# Patient Record
Sex: MALE | Race: WHITE | NOT HISPANIC OR LATINO | Employment: OTHER | URBAN - NONMETROPOLITAN AREA
[De-identification: names, ages, dates, MRNs, and addresses within clinical notes are randomized per-mention and may not be internally consistent; named-entity substitution may affect disease eponyms.]

---

## 2021-11-12 ENCOUNTER — HOSPITAL ENCOUNTER (EMERGENCY)
Facility: HOSPITAL | Age: 66
Discharge: HOME | End: 2021-11-12
Attending: NURSE PRACTITIONER | Admitting: NURSE PRACTITIONER
Payer: MEDICARE

## 2021-11-12 ENCOUNTER — APPOINTMENT (OUTPATIENT)
Dept: GENERAL RADIOLOGY | Facility: HOSPITAL | Age: 66
End: 2021-11-12
Attending: NURSE PRACTITIONER
Payer: MEDICARE

## 2021-11-12 VITALS
OXYGEN SATURATION: 98 % | SYSTOLIC BLOOD PRESSURE: 129 MMHG | DIASTOLIC BLOOD PRESSURE: 82 MMHG | TEMPERATURE: 98.9 F | RESPIRATION RATE: 16 BRPM | HEART RATE: 94 BPM

## 2021-11-12 DIAGNOSIS — M25.561 RIGHT MEDIAL KNEE PAIN: Primary | ICD-10-CM

## 2021-11-12 PROCEDURE — G0463 HOSPITAL OUTPT CLINIC VISIT: HCPCS

## 2021-11-12 PROCEDURE — 73562 X-RAY EXAM OF KNEE 3: CPT | Mod: RT

## 2021-11-12 PROCEDURE — 99213 OFFICE O/P EST LOW 20 MIN: CPT | Performed by: NURSE PRACTITIONER

## 2021-11-12 RX ORDER — LEVOTHYROXINE SODIUM 100 UG/1
100 TABLET ORAL
COMMUNITY
Start: 2021-05-05

## 2021-11-12 RX ORDER — LISINOPRIL 5 MG/1
TABLET ORAL
COMMUNITY
Start: 2021-01-29

## 2021-11-12 RX ORDER — LEVOCETIRIZINE DIHYDROCHLORIDE 5 MG/1
TABLET, FILM COATED ORAL
COMMUNITY
Start: 2021-01-26

## 2021-11-12 ASSESSMENT — ENCOUNTER SYMPTOMS
ARTHRALGIAS: 1
MYALGIAS: 1
JOINT SWELLING: 1

## 2021-11-12 NOTE — ED PROVIDER NOTES
History     Chief Complaint   Patient presents with     Knee Pain     HPI  Nick Russo is a 66 year old male who presents evaluation of right knee pain.  Patient notes that he slipped on/yesterday and fell injuring his right knee somehow.  Patient reports medial right knee pain which appears to worsen with extension and flexion of his knee.  He is able to ambulate but very slowly.  Denies any history of knee surgeries.  Patient has no other concerns.    Allergies:  Allergies   Allergen Reactions     Advil [Ibuprofen]      Bees      Penicillins        Problem List:    There are no problems to display for this patient.       Past Medical History:    History reviewed. No pertinent past medical history.    Past Surgical History:    History reviewed. No pertinent surgical history.    Family History:    History reviewed. No pertinent family history.    Social History:  Marital Status:   [2]  Social History     Tobacco Use     Smoking status: Never Smoker     Smokeless tobacco: Never Used   Substance Use Topics     Alcohol use: Yes     Drug use: Never        Medications:    levocetirizine (XYZAL) 5 MG tablet  levothyroxine (SYNTHROID/LEVOTHROID) 100 MCG tablet  lisinopril (ZESTRIL) 5 MG tablet          Review of Systems   Musculoskeletal: Positive for arthralgias, gait problem, joint swelling and myalgias.   All other systems reviewed and are negative.      Physical Exam   BP: 129/82  Pulse: 94  Temp: 98.9  F (37.2  C)  Resp: 16  SpO2: 98 %      Physical Exam  Vitals and nursing note reviewed.   Constitutional:       Appearance: Normal appearance. He is not ill-appearing or toxic-appearing.   HENT:      Head: Normocephalic.   Eyes:      Pupils: Pupils are equal, round, and reactive to light.   Cardiovascular:      Rate and Rhythm: Normal rate.   Pulmonary:      Effort: Pulmonary effort is normal.   Musculoskeletal:         General: Tenderness present.      Cervical back: Neck supple.      Right knee: Swelling  present. No deformity, erythema or ecchymosis. Tenderness present over the medial joint line. No lateral joint line, LCL, ACL or PCL tenderness.      Instability Tests: Anterior Lachman test negative. Medial Kevin test negative.      Right lower leg: No edema.      Left lower leg: No edema.   Skin:     General: Skin is warm and dry.      Capillary Refill: Capillary refill takes less than 2 seconds.      Findings: No bruising or erythema.   Neurological:      Mental Status: He is alert.         ED Course        Procedures         Results for orders placed or performed during the hospital encounter of 11/12/21 (from the past 24 hour(s))   XR Knee Right 3 Views    Narrative    PROCEDURE:  XR KNEE RIGHT 3 VIEWS    HISTORY: slipped and fell yesterday. c/o medial pain and some  difficulty ambulating.    COMPARISON:  None.    TECHNIQUE:  3 views of the right knee were obtained.    FINDINGS:  No fracture or dislocation is identified. The joint spaces  are preserved.        Impression    IMPRESSION: Normal right knee      TOM FLOR MD         SYSTEM ID:  [id number]       Medications - No data to display    Assessments & Plan (with Medical Decision Making)     I have reviewed the nursing notes.    66-year-old male that presented for concerns of right knee pain post fall that occurred yesterday.  Patient is able to extend and flex his knee but reports pain with both of those movements.  Tenderness to medial right knee along with mild swelling.  X-ray negative for acute fracture or dislocation.  Discussed all findings with patient noting suspect meniscus/ligament injury.  No appreciable LCL or MCL laxity.  Ace wrap applied in urgent care.  Recommended applying ice packs 15 minutes on/off and continuing to take Tylenol or ibuprofen as needed for pain.  Advised follow-up with PCP if no improvement in symptoms.  Return to ED/UC for worsening or concerning symptoms.    I have reviewed the findings, diagnosis, plan and need  for follow up with the patient.  This document was prepared using a combination of typing and voice generated software.  While every attempt was made for accuracy, spelling and grammatical errors may exist.    New Prescriptions    No medications on file       Final diagnoses:   Right medial knee pain       11/12/2021   [facility]     Mpofu, Prudence, CNP  11/12/21 8706

## 2021-11-12 NOTE — ED TRIAGE NOTES
Patient was stepping down in the slush, foot slipped out and patient went down. Unsure of what happened to his right knee

## 2021-11-12 NOTE — ED TRIAGE NOTES
"Pt presents with c/o right knee pain. Reports he slipped in the slush yesterday. States he felt something in the knee and that it has been really stiff. Also reports that if he changed positions/angles of the knee he gets a sharp pain. Resting pain is \"almost nothing\" and when he moves it pain is a 8/10. Pt states that last night he took two tylenol but otherwise that is it. Pt was able to ambulate by himself to room.  "

## 2021-11-12 NOTE — DISCHARGE INSTRUCTIONS
Use the Ace wrap, apply ice packs 15 minutes on/off.  Continue taking Tylenol or ibuprofen as needed for the pain.    Follow-up with your doctor as needed if no improvement in symptoms.    Return to emergency department for worsening or concerning symptoms.

## 2024-11-10 ENCOUNTER — HOSPITAL ENCOUNTER (EMERGENCY)
Facility: HOSPITAL | Age: 69
Discharge: HOME | End: 2024-11-10
Attending: EMERGENCY MEDICINE
Payer: MEDICARE

## 2024-11-10 VITALS
TEMPERATURE: 98.5 F | RESPIRATION RATE: 16 BRPM | OXYGEN SATURATION: 96 % | DIASTOLIC BLOOD PRESSURE: 92 MMHG | HEART RATE: 72 BPM | SYSTOLIC BLOOD PRESSURE: 153 MMHG

## 2024-11-10 DIAGNOSIS — S05.02XA ABRASION OF LEFT CORNEA, INITIAL ENCOUNTER: ICD-10-CM

## 2024-11-10 DIAGNOSIS — H53.8 BLURRED VISION, LEFT EYE: ICD-10-CM

## 2024-11-10 PROCEDURE — 99283 EMERGENCY DEPT VISIT LOW MDM: CPT

## 2024-11-10 PROCEDURE — 250N000009 HC RX 250: Performed by: EMERGENCY MEDICINE

## 2024-11-10 PROCEDURE — 99283 EMERGENCY DEPT VISIT LOW MDM: CPT | Performed by: EMERGENCY MEDICINE

## 2024-11-10 RX ORDER — TETRACAINE HYDROCHLORIDE 5 MG/ML
1-2 SOLUTION OPHTHALMIC ONCE
Status: COMPLETED | OUTPATIENT
Start: 2024-11-10 | End: 2024-11-10

## 2024-11-10 RX ORDER — ERYTHROMYCIN 5 MG/G
0.5 OINTMENT OPHTHALMIC 3 TIMES DAILY
Qty: 3.5 G | Refills: 0 | Status: SHIPPED | OUTPATIENT
Start: 2024-11-10 | End: 2024-11-17

## 2024-11-10 RX ADMIN — TETRACAINE HYDROCHLORIDE 1 DROP: 5 SOLUTION OPHTHALMIC at 13:31

## 2024-11-10 RX ADMIN — FLUORESCEIN SODIUM 1 STRIP: 1 STRIP OPHTHALMIC at 13:33

## 2024-11-10 ASSESSMENT — COLUMBIA-SUICIDE SEVERITY RATING SCALE - C-SSRS
1. IN THE PAST MONTH, HAVE YOU WISHED YOU WERE DEAD OR WISHED YOU COULD GO TO SLEEP AND NOT WAKE UP?: NO
2. HAVE YOU ACTUALLY HAD ANY THOUGHTS OF KILLING YOURSELF IN THE PAST MONTH?: NO
6. HAVE YOU EVER DONE ANYTHING, STARTED TO DO ANYTHING, OR PREPARED TO DO ANYTHING TO END YOUR LIFE?: NO

## 2024-11-10 ASSESSMENT — VISUAL ACUITY
OU: 20/70
OD: 20/70
OU: NORMAL
OS: 20/200

## 2024-11-10 NOTE — DISCHARGE INSTRUCTIONS
You were seen in the emergency department for left eye trauma and blurred vision.  Your evaluation showed a small scratch on the outer surface of the eye.  This is positioned over your cornea and we think this is why your vision is worse.  You could also be having early symptoms of another condition called traumatic iritis.  We are going to start you on a prophylactic antibiotic eye ointment.  Please take this as prescribed for the next week.  As we discussed we would recommend follow-up as soon as possible with a eye specialist.  We attached the number for the local eye clinic.  Please follow-up with them as soon as possible, ideally within the next day or two for a comprehensive eye exam and to follow-up any ongoing symptoms.

## 2024-11-10 NOTE — ED NOTES
Patient is discharged home in stable condition. Discharge instructions were given with no questions.

## 2024-11-10 NOTE — ED PROVIDER NOTES
EMERGENCY DEPARTMENT ENCOUNTER      NAME: Nick Russo  AGE: 69 year old male  YOB: 1955  MRN: [id number]  EVALUATION DATE & TIME: 11/10/2024  1:19 PM    PCP: No Ref-Primary, Physician    ED PROVIDER: Baudilio Yarbrough M.D.      Chief Complaint   Patient presents with    Eye Injury         FINAL IMPRESSION:  1. Abrasion of left cornea, initial encounter    2. Blurred vision, left eye          ED COURSE & MEDICAL DECISION MAKIN year old male presents to the Emergency Department for evaluation of left eye injury.  Patient sustained an injury almost 24 hours prior to arrival here.  On exam he has a small corneal abrasion that does involve the visual axis and could be accounting for the patient's decreased visual acuity.  His pupils are equal and reactive bilaterally.  Considered but seems less likely to represent something like a traumatic iritis.  No foreign bodies were identified on exam.  We discussed prophylactic erythromycin ointment and he was recommended to follow-up with eye clinic for continued evaluation. He was given contact information for this. Patient discharged in stable condition.    At the conclusion of the encounter I discussed the results of all of the tests and the disposition. The questions were answered. The patient or family acknowledged understanding and was agreeable with the care plan.           MEDICATIONS GIVEN IN THE EMERGENCY:  Medications   tetracaine (PONTOCAINE) 0.5 % ophthalmic solution 1-2 drop (1 drop Both Eyes $Given 11/10/24 1331)   fluorescein (FUL-SHEYLA) ophthalmic strip 1 strip (1 strip Both Eyes $Given 11/10/24 1333)       NEW PRESCRIPTIONS STARTED AT TODAY'S ER VISIT  New Prescriptions    ERYTHROMYCIN (ROMYCIN) 5 MG/GM OPHTHALMIC OINTMENT    Place 0.5 inches Into the left eye 3 times daily for 7 days.          =================================================================    HPI    Patient information was obtained from: Patient      Nick Russo is  a 69 year old male who presents to this ED today for evaluation of eye injury.  Patient sustained an injury yesterday afternoon.  He was hunting and moving through some thick brush when he got poked in the left eye with a stick.  Since that time he notes that he has some discomfort on his left eye and also his vision is blurred.  He normally has better vision in his left than his right.  He has glasses that he uses occasionally.  Does not wear contact lenses.       REVIEW OF SYSTEMS   All systems reviewed and negative except as noted in HPI.    PAST MEDICAL HISTORY:  No past medical history on file.    PAST SURGICAL HISTORY:  No past surgical history on file.        CURRENT MEDICATIONS:    No current facility-administered medications for this encounter.     Current Outpatient Medications   Medication Sig Dispense Refill    erythromycin (ROMYCIN) 5 MG/GM ophthalmic ointment Place 0.5 inches Into the left eye 3 times daily for 7 days. 3.5 g 0    levocetirizine (XYZAL) 5 MG tablet   See Instructions, Maintenance, Dispense: 90 Tablet, Refills: 3, Total Refills: 3, TAKE ONE TABLET BY MOUTH EVERY EVENING, 01/26/21 8:33:00 EST, Pharmacy: [facility], TAKE ONE TABLET BY MOUTH EVERY EVENING, 179, cm, 06/05/20 13:32:00 EDT, Heigh...      levothyroxine (SYNTHROID/LEVOTHROID) 100 MCG tablet Take 100 mcg by mouth      lisinopril (ZESTRIL) 5 MG tablet   See Instructions, Maintenance, Dispense: 90 Tablet, Refills: 3, Total Refills: 3, TAKE ONE TABLET BY MOUTH DAILY, 01/29/21 8:27:00 EST, Pharmacy: [facility], TAKE ONE TABLET BY MOUTH DAILY, 179, cm, 06/05/20 13:32:00 EDT, Height CM, 96.6, kg,...           ALLERGIES:  Allergies   Allergen Reactions    Advil [Ibuprofen]     Bees     Penicillins        FAMILY HISTORY:  No family history on file.    SOCIAL HISTORY:   Social History     Socioeconomic History    Marital status:    Tobacco Use    Smoking status: Never    Smokeless tobacco: Never   Substance and  Sexual Activity    Alcohol use: Yes    Drug use: Never       VITALS:  /92   Pulse 72   Temp 98.5  F (36.9  C) (Tympanic)   Resp 16   SpO2 96%     PHYSICAL EXAM    Constitutional: Well developed, Well nourished, NAD.   HENT: Normocephalic, Atraumatic. Neck Supple.  Eyes: There is a small corneal abrasion to the left anterior eye, just left of center and overlying a portion of the pupil.  Left eye conjunctiva is mildly injected.  Lids and lashes everted with no foreign bodies.  Extraocular movements are intact without pain.  Pupils are equal and reactive bilaterally.  Respiratory: Breathing comfortably on room air. Speaks full sentences easily. Lungs clear to ascultation.  Cardiovascular: Normal heart rate, Regular rhythm. No peripheral edema.  Abdomen: Soft  Musculoskeletal: Good range of motion in all major joints. No major deformities noted.  Integument: Warm, Dry.  Neurologic: Alert & awake, Normal motor function, Normal sensory function, No focal deficits noted.   Psychiatric: Cooperative. Affect appropriate.     LAB:  All pertinent labs reviewed and interpreted.  Labs Ordered and Resulted from Time of ED Arrival to Time of ED Departure - No data to display    RADIOLOGY:  Reviewed all pertinent imaging. Please see official radiology report.  No orders to display       Baudilio Yarbrough M.D.  Emergency Medicine  [facility]  [address] [place] [address] [id number] [phone]  Dept: [phone]       Baudilio Yarbrough MD  11/10/24 1151

## 2024-11-10 NOTE — ED TRIAGE NOTES
Pt reports a stick hitting him in the face and left eye yesterday while deer hunting. Vision this morning is blurry, and feels scratchy.